# Patient Record
Sex: MALE | Race: OTHER
[De-identification: names, ages, dates, MRNs, and addresses within clinical notes are randomized per-mention and may not be internally consistent; named-entity substitution may affect disease eponyms.]

---

## 2019-12-16 ENCOUNTER — HOSPITAL ENCOUNTER (EMERGENCY)
Dept: HOSPITAL 88 - FSED | Age: 24
Discharge: HOME | End: 2019-12-16
Payer: COMMERCIAL

## 2019-12-16 VITALS — HEIGHT: 66 IN | BODY MASS INDEX: 20.09 KG/M2 | WEIGHT: 125 LBS
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
## 2019-12-16 DIAGNOSIS — S61.214A: Primary | ICD-10-CM

## 2019-12-16 PROCEDURE — 73140 X-RAY EXAM OF FINGER(S): CPT

## 2019-12-16 PROCEDURE — 99283 EMERGENCY DEPT VISIT LOW MDM: CPT

## 2019-12-16 PROCEDURE — 64450 NJX AA&/STRD OTHER PN/BRANCH: CPT

## 2019-12-16 NOTE — DIAGNOSTIC IMAGING REPORT
FINGER RT - HOPD - 3 views



HISTORY:  Pain

COMPARISON: None available.

     

FINDINGS:

Bones:

No acute displaced fracture.  

Osseous alignment is within normal limits.



Joints:

The joint spaces are well-maintained.



Soft tissues:

Soft tissue defect of the tip of the fourth digit.





IMPRESSION: 

No acute osseous abnormality.



Signed by: Dr. Jered Mckinnon MD on 12/16/2019 11:08 PM

## 2019-12-17 VITALS — DIASTOLIC BLOOD PRESSURE: 64 MMHG | SYSTOLIC BLOOD PRESSURE: 127 MMHG

## 2019-12-20 NOTE — XMS REPORT
Patient Summary Document

                             Created on: 2019



JAIME KANG

External Reference #: [id number]

: 1995

Sex: Male



Demographics







                          Address                   [address]

[place]  [address]

 

                          Home Phone                [phone]

 

                          Preferred Language        Unknown

 

                          Marital Status            Unknown

 

                          Hinduism Affiliation     Unknown

 

                          Race                      Unknown

 

                          Ethnic Group              Unknown





Author







                          Author                    [facility]

 

                          Address                   Unknown

 

                          Phone                     Unavailable







Care Team Providers







                    Care Team Member Name    Role                Phone

 

                    MARTÍNEZ  IMELDA      Unavailable         Unavailable







Problems

This patient has no known problems.



Allergies, Adverse Reactions, Alerts

This patient has no known allergies or adverse reactions.



Medications

This patient has no known medications.



Results







           Test Description    Test Time    Test Comments    Text Results    Atomic Results    Result

 Comments

 

                FINGER RT - HOPD    2019 23:07:00                                                          

                                                [place] [address]      Patient Name: JAIME KANG                          
        MR #: [id number]                     : 1995                    
              Age/Sex: 24/M  Acct #: [id number]                              
Req #: [id number]  [facility] Physician:                                               
      Ordered by: IMELDA MARTÍNEZ DO                            Report #: [id number]
[id number]        Location: [facility]                                    Room/Bed:         
           
___________________________________________________________________________________________________
   Procedure: [id number] HOPD/FINGER RT - HOPD  Exam Date: 19              
             Exam Time: 5                                              REPORT
STATUS: Signed    FINGER RT - HOPD - 3 views      HISTORY:  Pain   COMPARISON: 
None available.           FINDINGS:   Bones:   No acute displaced fracture.     
Osseous alignment is within normal limits.      Joints:   The joint spaces are 
well-maintained.      Soft tissues:   Soft tissue defect of the tip of the 
fourth digit.         IMPRESSION:    No acute osseous abnormality.      Signed 
by: Dr. Jered Santacruz MD on 2019 11:08 PM        Dictated By: JERED TRIANA MD  Electronically Signed By: JERED SANTACRUZ MD on 19  Transcribed 
By: LIAM on 19       COPY TO:   IMELDA MARTÍNEZ DO